# Patient Record
Sex: FEMALE | Race: WHITE | Employment: OTHER | URBAN - NONMETROPOLITAN AREA
[De-identification: names, ages, dates, MRNs, and addresses within clinical notes are randomized per-mention and may not be internally consistent; named-entity substitution may affect disease eponyms.]

---

## 2017-06-26 ENCOUNTER — OFFICE VISIT (OUTPATIENT)
Dept: AUDIOLOGY | Age: 77
End: 2017-06-26

## 2017-06-26 DIAGNOSIS — H90.3 SENSORINEURAL HEARING LOSS, BILATERAL: Primary | ICD-10-CM

## 2019-07-01 ENCOUNTER — HOSPITAL ENCOUNTER (OUTPATIENT)
Dept: AUDIOLOGY | Age: 79
Discharge: HOME | End: 2019-07-01

## 2019-07-01 PROCEDURE — 92592 HC HEARING AID CHECK, ONE EAR: CPT | Performed by: AUDIOLOGIST

## 2019-07-01 NOTE — PROGRESS NOTES
ACCOUNT #: [de-identified]    DIAGNOSIS: H90.3    HEARING AID PROBLEM: Patient stated that her hearing aids need cleaned and she needs new earmold tubing. Both hearing aids were cleaned. A new ear hook and aguilar filter were installed on both. New tubing installed in both earmolds. A listening check revealed normal sound quality. The gain was increased, bilaterally. The patient will follow up as needed.